# Patient Record
Sex: MALE | Race: WHITE | NOT HISPANIC OR LATINO | URBAN - METROPOLITAN AREA
[De-identification: names, ages, dates, MRNs, and addresses within clinical notes are randomized per-mention and may not be internally consistent; named-entity substitution may affect disease eponyms.]

---

## 2021-12-13 ENCOUNTER — OFFICE (OUTPATIENT)
Dept: URBAN - METROPOLITAN AREA PATHOLOGY 22 | Facility: PATHOLOGY | Age: 61
End: 2021-12-13

## 2021-12-13 ENCOUNTER — AMBULATORY SURGICAL CENTER (OUTPATIENT)
Dept: URBAN - METROPOLITAN AREA SURGERY 1 | Facility: SURGERY | Age: 61
End: 2021-12-13

## 2021-12-13 VITALS
SYSTOLIC BLOOD PRESSURE: 169 MMHG | RESPIRATION RATE: 18 BRPM | WEIGHT: 236 LBS | OXYGEN SATURATION: 96 % | SYSTOLIC BLOOD PRESSURE: 161 MMHG | TEMPERATURE: 97.5 F | OXYGEN SATURATION: 97 % | SYSTOLIC BLOOD PRESSURE: 149 MMHG | SYSTOLIC BLOOD PRESSURE: 177 MMHG | DIASTOLIC BLOOD PRESSURE: 78 MMHG | SYSTOLIC BLOOD PRESSURE: 149 MMHG | DIASTOLIC BLOOD PRESSURE: 98 MMHG | RESPIRATION RATE: 16 BRPM | SYSTOLIC BLOOD PRESSURE: 170 MMHG | RESPIRATION RATE: 19 BRPM | HEART RATE: 70 BPM | HEART RATE: 67 BPM | OXYGEN SATURATION: 95 % | WEIGHT: 236 LBS | RESPIRATION RATE: 18 BRPM | RESPIRATION RATE: 18 BRPM | RESPIRATION RATE: 20 BRPM | RESPIRATION RATE: 19 BRPM | DIASTOLIC BLOOD PRESSURE: 93 MMHG | RESPIRATION RATE: 16 BRPM | HEIGHT: 69 IN | HEART RATE: 67 BPM | SYSTOLIC BLOOD PRESSURE: 170 MMHG | OXYGEN SATURATION: 96 % | HEART RATE: 81 BPM | SYSTOLIC BLOOD PRESSURE: 161 MMHG | RESPIRATION RATE: 20 BRPM | SYSTOLIC BLOOD PRESSURE: 177 MMHG | TEMPERATURE: 97.5 F | SYSTOLIC BLOOD PRESSURE: 161 MMHG | DIASTOLIC BLOOD PRESSURE: 96 MMHG | OXYGEN SATURATION: 97 % | DIASTOLIC BLOOD PRESSURE: 93 MMHG | WEIGHT: 236 LBS | SYSTOLIC BLOOD PRESSURE: 169 MMHG | HEART RATE: 70 BPM | SYSTOLIC BLOOD PRESSURE: 177 MMHG | SYSTOLIC BLOOD PRESSURE: 169 MMHG | OXYGEN SATURATION: 96 % | OXYGEN SATURATION: 95 % | DIASTOLIC BLOOD PRESSURE: 96 MMHG | SYSTOLIC BLOOD PRESSURE: 149 MMHG | DIASTOLIC BLOOD PRESSURE: 78 MMHG | DIASTOLIC BLOOD PRESSURE: 78 MMHG | HEART RATE: 70 BPM | RESPIRATION RATE: 19 BRPM | RESPIRATION RATE: 20 BRPM | HEIGHT: 69 IN | HEART RATE: 81 BPM | HEART RATE: 79 BPM | OXYGEN SATURATION: 97 % | TEMPERATURE: 97.5 F | OXYGEN SATURATION: 95 % | DIASTOLIC BLOOD PRESSURE: 93 MMHG | DIASTOLIC BLOOD PRESSURE: 98 MMHG | HEART RATE: 79 BPM | HEIGHT: 69 IN | HEART RATE: 67 BPM | HEART RATE: 81 BPM | DIASTOLIC BLOOD PRESSURE: 96 MMHG | HEART RATE: 79 BPM | DIASTOLIC BLOOD PRESSURE: 98 MMHG | SYSTOLIC BLOOD PRESSURE: 170 MMHG | RESPIRATION RATE: 16 BRPM

## 2021-12-13 DIAGNOSIS — K57.30 DIVERTICULOSIS OF LARGE INTESTINE WITHOUT PERFORATION OR ABS: ICD-10-CM

## 2021-12-13 DIAGNOSIS — Z86.010 PERSONAL HISTORY OF COLONIC POLYPS: ICD-10-CM

## 2021-12-13 DIAGNOSIS — D12.3 BENIGN NEOPLASM OF TRANSVERSE COLON: ICD-10-CM

## 2021-12-13 DIAGNOSIS — D12.8 BENIGN NEOPLASM OF RECTUM: ICD-10-CM

## 2021-12-13 DIAGNOSIS — D12.7 BENIGN NEOPLASM OF RECTOSIGMOID JUNCTION: ICD-10-CM

## 2021-12-13 PROBLEM — K62.1 RECTAL POLYP: Status: ACTIVE | Noted: 2021-12-13

## 2021-12-13 PROBLEM — K63.5 POLYP OF COLON: Status: ACTIVE | Noted: 2021-12-13

## 2021-12-13 PROCEDURE — 45385 COLONOSCOPY W/LESION REMOVAL: CPT | Performed by: INTERNAL MEDICINE

## 2021-12-13 PROCEDURE — 88305 TISSUE EXAM BY PATHOLOGIST: CPT | Performed by: INTERNAL MEDICINE

## 2021-12-13 NOTE — SERVICENOTES
Start time: 1125
Cecum: 1126
TI intubation: no 
End time:1135

[place] Bowel Prep Score :  6
Right colon:                            2
transverse colon:                   2 
left colon:                                 2

## 2021-12-13 NOTE — SERVICENOTES
Start time: 1125
Cecum: 1126
TI intubation: no 
End time:1135

[place] Bowel Prep Score :  6
Right colon:                            2
transverse colon:                   2 
left colon:                                 2

## 2021-12-13 NOTE — SERVICENOTES
Start time: 1125
Cecum: 1126
TI intubation: no 
End time:1135

[place] Bowel Prep Score :  6
Right colon:                            2
transverse colon:                   2 
left colon:                                 2